# Patient Record
Sex: FEMALE | Race: BLACK OR AFRICAN AMERICAN | NOT HISPANIC OR LATINO | URBAN - METROPOLITAN AREA
[De-identification: names, ages, dates, MRNs, and addresses within clinical notes are randomized per-mention and may not be internally consistent; named-entity substitution may affect disease eponyms.]

---

## 2018-09-25 ENCOUNTER — EMERGENCY (EMERGENCY)
Facility: HOSPITAL | Age: 38
LOS: 0 days | Discharge: HOME | End: 2018-09-25
Attending: EMERGENCY MEDICINE
Payer: COMMERCIAL

## 2018-09-25 VITALS
TEMPERATURE: 98 F | SYSTOLIC BLOOD PRESSURE: 132 MMHG | RESPIRATION RATE: 16 BRPM | HEART RATE: 76 BPM | OXYGEN SATURATION: 99 % | DIASTOLIC BLOOD PRESSURE: 88 MMHG

## 2018-09-25 VITALS
HEIGHT: 66 IN | SYSTOLIC BLOOD PRESSURE: 138 MMHG | HEART RATE: 74 BPM | TEMPERATURE: 98 F | OXYGEN SATURATION: 99 % | RESPIRATION RATE: 18 BRPM | WEIGHT: 200.4 LBS | DIASTOLIC BLOOD PRESSURE: 91 MMHG

## 2018-09-25 DIAGNOSIS — R51 HEADACHE: ICD-10-CM

## 2018-09-25 DIAGNOSIS — D25.9 LEIOMYOMA OF UTERUS, UNSPECIFIED: ICD-10-CM

## 2018-09-25 DIAGNOSIS — N94.6 DYSMENORRHEA, UNSPECIFIED: ICD-10-CM

## 2018-09-25 LAB
ALBUMIN SERPL ELPH-MCNC: 3.7 G/DL — SIGNIFICANT CHANGE UP (ref 3.3–5)
ALP SERPL-CCNC: 41 U/L — SIGNIFICANT CHANGE UP (ref 40–120)
ALT FLD-CCNC: 30 U/L — SIGNIFICANT CHANGE UP (ref 12–78)
ANION GAP SERPL CALC-SCNC: 7 MMOL/L — SIGNIFICANT CHANGE UP (ref 5–17)
APTT BLD: 29.5 SEC — SIGNIFICANT CHANGE UP (ref 27.5–37.4)
AST SERPL-CCNC: 20 U/L — SIGNIFICANT CHANGE UP (ref 15–37)
BASOPHILS # BLD AUTO: 0.04 K/UL — SIGNIFICANT CHANGE UP (ref 0–0.2)
BASOPHILS NFR BLD AUTO: 0.5 % — SIGNIFICANT CHANGE UP (ref 0–2)
BILIRUB SERPL-MCNC: 0.4 MG/DL — SIGNIFICANT CHANGE UP (ref 0.2–1.2)
BLD GP AB SCN SERPL QL: SIGNIFICANT CHANGE UP
BUN SERPL-MCNC: 10 MG/DL — SIGNIFICANT CHANGE UP (ref 7–23)
CALCIUM SERPL-MCNC: 9.5 MG/DL — SIGNIFICANT CHANGE UP (ref 8.5–10.1)
CHLORIDE SERPL-SCNC: 106 MMOL/L — SIGNIFICANT CHANGE UP (ref 96–108)
CO2 SERPL-SCNC: 28 MMOL/L — SIGNIFICANT CHANGE UP (ref 22–31)
CREAT SERPL-MCNC: 0.8 MG/DL — SIGNIFICANT CHANGE UP (ref 0.5–1.3)
EOSINOPHIL # BLD AUTO: 0.08 K/UL — SIGNIFICANT CHANGE UP (ref 0–0.5)
EOSINOPHIL NFR BLD AUTO: 1 % — SIGNIFICANT CHANGE UP (ref 0–6)
GLUCOSE SERPL-MCNC: 77 MG/DL — SIGNIFICANT CHANGE UP (ref 70–99)
HCG SERPL-ACNC: <1 MIU/ML — SIGNIFICANT CHANGE UP
HCT VFR BLD CALC: 34.9 % — SIGNIFICANT CHANGE UP (ref 34.5–45)
HGB BLD-MCNC: 12.2 G/DL — SIGNIFICANT CHANGE UP (ref 11.5–15.5)
IMM GRANULOCYTES NFR BLD AUTO: 0.1 % — SIGNIFICANT CHANGE UP (ref 0–1.5)
INR BLD: 1.11 RATIO — SIGNIFICANT CHANGE UP (ref 0.88–1.16)
LYMPHOCYTES # BLD AUTO: 3.99 K/UL — HIGH (ref 1–3.3)
LYMPHOCYTES # BLD AUTO: 48.3 % — HIGH (ref 13–44)
MCHC RBC-ENTMCNC: 26.7 PG — LOW (ref 27–34)
MCHC RBC-ENTMCNC: 35 GM/DL — SIGNIFICANT CHANGE UP (ref 32–36)
MCV RBC AUTO: 76.4 FL — LOW (ref 80–100)
MONOCYTES # BLD AUTO: 0.5 K/UL — SIGNIFICANT CHANGE UP (ref 0–0.9)
MONOCYTES NFR BLD AUTO: 6.1 % — SIGNIFICANT CHANGE UP (ref 2–14)
NEUTROPHILS # BLD AUTO: 3.64 K/UL — SIGNIFICANT CHANGE UP (ref 1.8–7.4)
NEUTROPHILS NFR BLD AUTO: 44 % — SIGNIFICANT CHANGE UP (ref 43–77)
NRBC # BLD: 0 /100 WBCS — SIGNIFICANT CHANGE UP (ref 0–0)
PLATELET # BLD AUTO: 263 K/UL — SIGNIFICANT CHANGE UP (ref 150–400)
POTASSIUM SERPL-MCNC: 4.1 MMOL/L — SIGNIFICANT CHANGE UP (ref 3.5–5.3)
POTASSIUM SERPL-SCNC: 4.1 MMOL/L — SIGNIFICANT CHANGE UP (ref 3.5–5.3)
PROT SERPL-MCNC: 8 GM/DL — SIGNIFICANT CHANGE UP (ref 6–8.3)
PROTHROM AB SERPL-ACNC: 12.1 SEC — SIGNIFICANT CHANGE UP (ref 9.8–12.7)
RBC # BLD: 4.57 M/UL — SIGNIFICANT CHANGE UP (ref 3.8–5.2)
RBC # FLD: 14.6 % — HIGH (ref 10.3–14.5)
SODIUM SERPL-SCNC: 141 MMOL/L — SIGNIFICANT CHANGE UP (ref 135–145)
WBC # BLD: 8.26 K/UL — SIGNIFICANT CHANGE UP (ref 3.8–10.5)
WBC # FLD AUTO: 8.26 K/UL — SIGNIFICANT CHANGE UP (ref 3.8–10.5)

## 2018-09-25 PROCEDURE — 70450 CT HEAD/BRAIN W/O DYE: CPT | Mod: 26

## 2018-09-25 PROCEDURE — 99284 EMERGENCY DEPT VISIT MOD MDM: CPT

## 2018-09-25 RX ORDER — KETOROLAC TROMETHAMINE 30 MG/ML
30 SYRINGE (ML) INJECTION ONCE
Qty: 0 | Refills: 0 | Status: DISCONTINUED | OUTPATIENT
Start: 2018-09-25 | End: 2018-09-25

## 2018-09-25 RX ORDER — SODIUM CHLORIDE 9 MG/ML
1000 INJECTION INTRAMUSCULAR; INTRAVENOUS; SUBCUTANEOUS ONCE
Qty: 0 | Refills: 0 | Status: COMPLETED | OUTPATIENT
Start: 2018-09-25 | End: 2018-09-25

## 2018-09-25 RX ORDER — ACETAMINOPHEN 500 MG
975 TABLET ORAL ONCE
Qty: 0 | Refills: 0 | Status: COMPLETED | OUTPATIENT
Start: 2018-09-25 | End: 2018-09-25

## 2018-09-25 RX ORDER — METOCLOPRAMIDE HCL 10 MG
10 TABLET ORAL ONCE
Qty: 0 | Refills: 0 | Status: COMPLETED | OUTPATIENT
Start: 2018-09-25 | End: 2018-09-25

## 2018-09-25 RX ADMIN — Medication 975 MILLIGRAM(S): at 17:44

## 2018-09-25 RX ADMIN — SODIUM CHLORIDE 1000 MILLILITER(S): 9 INJECTION INTRAMUSCULAR; INTRAVENOUS; SUBCUTANEOUS at 16:18

## 2018-09-25 RX ADMIN — Medication 10 MILLIGRAM(S): at 16:18

## 2018-09-25 RX ADMIN — Medication 975 MILLIGRAM(S): at 16:18

## 2018-09-25 RX ADMIN — SODIUM CHLORIDE 1000 MILLILITER(S): 9 INJECTION INTRAMUSCULAR; INTRAVENOUS; SUBCUTANEOUS at 17:44

## 2018-09-25 NOTE — ED ADULT NURSE NOTE - NSIMPLEMENTINTERV_GEN_ALL_ED
Implemented All Universal Safety Interventions:  [place] to call system. Call bell, personal items and telephone within reach. Instruct patient to call for assistance. Room bathroom lighting operational. Non-slip footwear when patient is off stretcher. Physically safe environment: no spills, clutter or unnecessary equipment. Stretcher in lowest position, wheels locked, appropriate side rails in place.

## 2018-09-25 NOTE — ED PROVIDER NOTE - CARE PLAN
Principal Discharge DX:	Dysmenorrhea  Secondary Diagnosis:	Uterine fibroid  Secondary Diagnosis:	Headache, unspecified headache type

## 2018-09-25 NOTE — ED PROVIDER NOTE - NONTENDER LOCATION
periumbilical/left costovertebral angle/right lower quadrant/left upper quadrant/right upper quadrant/right costovertebral angle/left lower quadrant/umbilical/suprapubic

## 2018-09-25 NOTE — ED PROVIDER NOTE - MEDICAL DECISION MAKING DETAILS
hx, exam, labs, ct of head, no us of pelvis is indicated now because pt sts she had us of pelvis in August of this year showed 5 cm uterine fibroid and pt is pain free now.

## 2018-09-25 NOTE — ED ADULT TRIAGE NOTE - CHIEF COMPLAINT QUOTE
headache x3 days, bp 150/100 yesterday, heavy vaginal bleeding for months with constant left  abdomen and groin pain .

## 2018-09-25 NOTE — ED PROVIDER NOTE - OBJECTIVE STATEMENT
37 years old female walked in c/o right side headache constant dizziness and left pelvis pain for 4 days. Pt sts she is having her normal menstrual cycle now. Pt sts she has a hx of uterine fibroid for 10 years last us of pelvis was in August 2018 and showed 5 cm left side uterine fibroid and her ob/gyn recommends sx.. Pt denies trauma, loc, blurred visions, light sensitivities, focal/distal weakness or numbness, cough, sob, chest pain, nausea, vomiting, fever, chills, dysuria or irregular bowel movements.

## 2018-09-25 NOTE — ED PROVIDER NOTE - PROGRESS NOTE DETAILS
Pt is smiling laughing appears very comfortable no focal neuro deficits on exam pt's abd is soft nontender to palp at all quadrants. Pt is given and explained all test reports and advised to follow up with her ob/gyn or Dr. Atkins as soon as possible.

## 2018-09-25 NOTE — ED ADULT NURSE NOTE - OBJECTIVE STATEMENT
received er bed c c/o headaches x 3-4 days dizziness intermittantly denies n/v no photosensitivity a&ox 3 denies recent falls or head trauma

## 2020-04-08 ENCOUNTER — EMERGENCY (EMERGENCY)
Facility: HOSPITAL | Age: 40
LOS: 0 days | Discharge: HOME | End: 2020-04-09
Attending: EMERGENCY MEDICINE
Payer: COMMERCIAL

## 2020-04-08 VITALS
DIASTOLIC BLOOD PRESSURE: 110 MMHG | SYSTOLIC BLOOD PRESSURE: 148 MMHG | TEMPERATURE: 98 F | WEIGHT: 205.03 LBS | RESPIRATION RATE: 20 BRPM | OXYGEN SATURATION: 100 % | HEIGHT: 66 IN | HEART RATE: 106 BPM

## 2020-04-08 PROCEDURE — 99285 EMERGENCY DEPT VISIT HI MDM: CPT

## 2020-04-08 PROCEDURE — 93010 ELECTROCARDIOGRAM REPORT: CPT

## 2020-04-08 NOTE — ED ADULT TRIAGE NOTE - CHIEF COMPLAINT QUOTE
Pt c/o intermittent chest tightness , cough , and sob x yesterday, pt stated chest tightness worsen today. h/o HTN.

## 2020-04-09 VITALS
TEMPERATURE: 98 F | OXYGEN SATURATION: 99 % | SYSTOLIC BLOOD PRESSURE: 134 MMHG | DIASTOLIC BLOOD PRESSURE: 76 MMHG | HEART RATE: 92 BPM | RESPIRATION RATE: 18 BRPM

## 2020-04-09 DIAGNOSIS — R06.02 SHORTNESS OF BREATH: ICD-10-CM

## 2020-04-09 DIAGNOSIS — R05 COUGH: ICD-10-CM

## 2020-04-09 DIAGNOSIS — R07.9 CHEST PAIN, UNSPECIFIED: ICD-10-CM

## 2020-04-09 DIAGNOSIS — D25.9 LEIOMYOMA OF UTERUS, UNSPECIFIED: ICD-10-CM

## 2020-04-09 DIAGNOSIS — K21.9 GASTRO-ESOPHAGEAL REFLUX DISEASE WITHOUT ESOPHAGITIS: ICD-10-CM

## 2020-04-09 DIAGNOSIS — I10 ESSENTIAL (PRIMARY) HYPERTENSION: ICD-10-CM

## 2020-04-09 LAB
ALBUMIN SERPL ELPH-MCNC: 3.2 G/DL — LOW (ref 3.3–5)
ALP SERPL-CCNC: 48 U/L — SIGNIFICANT CHANGE UP (ref 40–120)
ALT FLD-CCNC: 30 U/L — SIGNIFICANT CHANGE UP (ref 12–78)
ANION GAP SERPL CALC-SCNC: 10 MMOL/L — SIGNIFICANT CHANGE UP (ref 5–17)
ANISOCYTOSIS BLD QL: SLIGHT — SIGNIFICANT CHANGE UP
APTT BLD: 28.5 SEC — SIGNIFICANT CHANGE UP (ref 28.5–37)
AST SERPL-CCNC: 14 U/L — LOW (ref 15–37)
BASOPHILS # BLD AUTO: 0.03 K/UL — SIGNIFICANT CHANGE UP (ref 0–0.2)
BASOPHILS NFR BLD AUTO: 0.3 % — SIGNIFICANT CHANGE UP (ref 0–2)
BILIRUB SERPL-MCNC: 0.2 MG/DL — SIGNIFICANT CHANGE UP (ref 0.2–1.2)
BUN SERPL-MCNC: 9 MG/DL — SIGNIFICANT CHANGE UP (ref 7–23)
CALCIUM SERPL-MCNC: 8.4 MG/DL — LOW (ref 8.5–10.1)
CHLORIDE SERPL-SCNC: 107 MMOL/L — SIGNIFICANT CHANGE UP (ref 96–108)
CO2 SERPL-SCNC: 24 MMOL/L — SIGNIFICANT CHANGE UP (ref 22–31)
CREAT SERPL-MCNC: 0.96 MG/DL — SIGNIFICANT CHANGE UP (ref 0.5–1.3)
EOSINOPHIL # BLD AUTO: 0.06 K/UL — SIGNIFICANT CHANGE UP (ref 0–0.5)
EOSINOPHIL NFR BLD AUTO: 0.6 % — SIGNIFICANT CHANGE UP (ref 0–6)
GLUCOSE SERPL-MCNC: 150 MG/DL — HIGH (ref 70–99)
HCG SERPL-ACNC: <1 MIU/ML — SIGNIFICANT CHANGE UP
HCT VFR BLD CALC: 33.7 % — LOW (ref 34.5–45)
HGB BLD-MCNC: 11.8 G/DL — SIGNIFICANT CHANGE UP (ref 11.5–15.5)
IMM GRANULOCYTES NFR BLD AUTO: 0.2 % — SIGNIFICANT CHANGE UP (ref 0–1.5)
INR BLD: 1.18 RATIO — HIGH (ref 0.88–1.16)
LYMPHOCYTES # BLD AUTO: 3.27 K/UL — SIGNIFICANT CHANGE UP (ref 1–3.3)
LYMPHOCYTES # BLD AUTO: 31.2 % — SIGNIFICANT CHANGE UP (ref 13–44)
MACROCYTES BLD QL: SLIGHT — SIGNIFICANT CHANGE UP
MAGNESIUM SERPL-MCNC: 2.2 MG/DL — SIGNIFICANT CHANGE UP (ref 1.6–2.6)
MANUAL SMEAR VERIFICATION: SIGNIFICANT CHANGE UP
MCHC RBC-ENTMCNC: 26.1 PG — LOW (ref 27–34)
MCHC RBC-ENTMCNC: 35 GM/DL — SIGNIFICANT CHANGE UP (ref 32–36)
MCV RBC AUTO: 74.6 FL — LOW (ref 80–100)
MICROCYTES BLD QL: SLIGHT — SIGNIFICANT CHANGE UP
MONOCYTES # BLD AUTO: 1.09 K/UL — HIGH (ref 0–0.9)
MONOCYTES NFR BLD AUTO: 10.4 % — SIGNIFICANT CHANGE UP (ref 2–14)
NEUTROPHILS # BLD AUTO: 6 K/UL — SIGNIFICANT CHANGE UP (ref 1.8–7.4)
NEUTROPHILS NFR BLD AUTO: 57.3 % — SIGNIFICANT CHANGE UP (ref 43–77)
NRBC # BLD: 0 /100 WBCS — SIGNIFICANT CHANGE UP (ref 0–0)
NT-PROBNP SERPL-SCNC: 22 PG/ML — SIGNIFICANT CHANGE UP (ref 0–125)
PLAT MORPH BLD: NORMAL — SIGNIFICANT CHANGE UP
PLATELET # BLD AUTO: 254 K/UL — SIGNIFICANT CHANGE UP (ref 150–400)
POTASSIUM SERPL-MCNC: 4.1 MMOL/L — SIGNIFICANT CHANGE UP (ref 3.5–5.3)
POTASSIUM SERPL-SCNC: 4.1 MMOL/L — SIGNIFICANT CHANGE UP (ref 3.5–5.3)
PROT SERPL-MCNC: 7.6 GM/DL — SIGNIFICANT CHANGE UP (ref 6–8.3)
PROTHROM AB SERPL-ACNC: 13.3 SEC — HIGH (ref 10–12.9)
RBC # BLD: 4.52 M/UL — SIGNIFICANT CHANGE UP (ref 3.8–5.2)
RBC # FLD: 15.1 % — HIGH (ref 10.3–14.5)
RBC BLD AUTO: SIGNIFICANT CHANGE UP
SODIUM SERPL-SCNC: 141 MMOL/L — SIGNIFICANT CHANGE UP (ref 135–145)
TARGETS BLD QL SMEAR: SLIGHT — SIGNIFICANT CHANGE UP
TROPONIN I SERPL-MCNC: <.015 NG/ML — SIGNIFICANT CHANGE UP (ref 0.01–0.04)
TROPONIN I SERPL-MCNC: <.015 NG/ML — SIGNIFICANT CHANGE UP (ref 0.01–0.04)
WBC # BLD: 10.47 K/UL — SIGNIFICANT CHANGE UP (ref 3.8–10.5)
WBC # FLD AUTO: 10.47 K/UL — SIGNIFICANT CHANGE UP (ref 3.8–10.5)

## 2020-04-09 PROCEDURE — 71045 X-RAY EXAM CHEST 1 VIEW: CPT | Mod: 26

## 2020-04-09 RX ORDER — SUCRALFATE 1 G
1 TABLET ORAL ONCE
Refills: 0 | Status: COMPLETED | OUTPATIENT
Start: 2020-04-09 | End: 2020-04-09

## 2020-04-09 RX ORDER — FAMOTIDINE 10 MG/ML
1 INJECTION INTRAVENOUS
Qty: 28 | Refills: 0
Start: 2020-04-09 | End: 2020-04-22

## 2020-04-09 RX ORDER — SUCRALFATE 1 G
1 TABLET ORAL
Qty: 56 | Refills: 0
Start: 2020-04-09 | End: 2020-04-22

## 2020-04-09 RX ORDER — ALPRAZOLAM 0.25 MG
0.25 TABLET ORAL ONCE
Refills: 0 | Status: DISCONTINUED | OUTPATIENT
Start: 2020-04-09 | End: 2020-04-09

## 2020-04-09 RX ORDER — FAMOTIDINE 10 MG/ML
20 INJECTION INTRAVENOUS ONCE
Refills: 0 | Status: COMPLETED | OUTPATIENT
Start: 2020-04-09 | End: 2020-04-09

## 2020-04-09 RX ORDER — LIDOCAINE 4 G/100G
10 CREAM TOPICAL ONCE
Refills: 0 | Status: COMPLETED | OUTPATIENT
Start: 2020-04-09 | End: 2020-04-09

## 2020-04-09 RX ORDER — LIDOCAINE 4 G/100G
15 CREAM TOPICAL ONCE
Refills: 0 | Status: COMPLETED | OUTPATIENT
Start: 2020-04-09 | End: 2020-04-09

## 2020-04-09 RX ADMIN — LIDOCAINE 10 MILLILITER(S): 4 CREAM TOPICAL at 05:08

## 2020-04-09 RX ADMIN — Medication 1 GRAM(S): at 02:46

## 2020-04-09 RX ADMIN — Medication 30 MILLILITER(S): at 00:22

## 2020-04-09 RX ADMIN — Medication 0.25 MILLIGRAM(S): at 05:08

## 2020-04-09 RX ADMIN — FAMOTIDINE 20 MILLIGRAM(S): 10 INJECTION INTRAVENOUS at 00:22

## 2020-04-09 RX ADMIN — LIDOCAINE 15 MILLILITER(S): 4 CREAM TOPICAL at 02:46

## 2020-04-09 NOTE — ED PROVIDER NOTE - CLINICAL SUMMARY MEDICAL DECISION MAKING FREE TEXT BOX
Lab values do not require emergent intervention. EKG wnl. Pt well appearing, VSS, comfortable, pain resolved with gi cocktail. likely gerd.

## 2020-04-09 NOTE — ED ADULT NURSE REASSESSMENT NOTE - NS ED NURSE REASSESS COMMENT FT1
pt d/c awake and alert in nad ; reports relief from epigastric/chest pain; piv removed intact; safety maintianed

## 2020-04-09 NOTE — ED PROVIDER NOTE - PATIENT PORTAL LINK FT
You can access the FollowMyHealth Patient Portal offered by [facility] by registering at the following website: [email or web address]. By joining [employer]’s FollowMyHealth portal, you will also be able to view your health information using other applications (apps) compatible with our system. You can access the FollowMyHealth Patient Portal offered by [facility] by registering at the following website: [email or web address]. By joining [employer]’s FollowMyHealth portal, you will also be able to view your health information using other applications (apps) compatible with our system.

## 2020-04-09 NOTE — ED PROVIDER NOTE - CARE PROVIDER_API CALL
Farzad Lloyd)  Medicine  [address] [place] [address]  Phone: [phone]  Fax: [phone]  Follow Up Time:     Mohit Jacobson)  Cardiology; Interventional Cardiology  [address] [place] [id number]  Phone: [phone]  Fax: [phone]  Follow Up Time:

## 2021-06-16 NOTE — ED ADULT NURSE NOTE - NSSISCREENINGQ3_ED_A_ED
Refill Approved    Rx renewed per Medication Renewal Policy. Medication was last renewed on 8/5/20.    Jack Bojorquez, [facility] Triage/Med Refill 3/19/2021     Requested Prescriptions   Pending Prescriptions Disp Refills     lisinopriL (PRINIVIL,ZESTRIL) 20 MG tablet [Pharmacy Med Name: LISINOPRIL TABS 20MG] 90 tablet 3     Sig: TAKE 1 TABLET DAILY (NEW DOSE)       Ace Inhibitors Refill Protocol Passed - 3/17/2021 11:32 PM        Passed - PCP or prescribing provider visit in past 12 months       Last office visit with prescriber/PCP: 10/20/2020 Ronnie Overton MD OR same dept: 10/20/2020 Ronnie Overton MD OR same specialty: 10/20/2020 Ronnie Overton MD  Last physical: 7/14/2020 Last MTM visit: Visit date not found   Next visit within 3 mo: Visit date not found  Next physical within 3 mo: Visit date not found  Prescriber OR PCP: Ronnie Overton MD  Last diagnosis associated with med order: 1. Essential hypertension  - lisinopriL (PRINIVIL,ZESTRIL) 20 MG tablet [Pharmacy Med Name: LISINOPRIL TABS 20MG]; TAKE 1 TABLET DAILY (NEW DOSE)  Dispense: 90 tablet; Refill: 3    If protocol passes may refill for 12 months if within 3 months of last provider visit (or a total of 15 months).             Passed - Serum Potassium in past 12 months     Lab Results   Component Value Date    Potassium 4.2 07/14/2020             Passed - Blood pressure filed in past 12 months     BP Readings from Last 1 Encounters:   10/20/20 122/80             Passed - Serum Creatinine in past 12 months     Creatinine   Date Value Ref Range Status   07/14/2020 1.21 (H) 0.60 - 1.10 mg/dL Final                            
No

## 2022-07-10 ENCOUNTER — EMERGENCY (EMERGENCY)
Facility: HOSPITAL | Age: 42
LOS: 0 days | Discharge: HOME | End: 2022-07-10
Attending: STUDENT IN AN ORGANIZED HEALTH CARE EDUCATION/TRAINING PROGRAM

## 2022-07-10 VITALS
DIASTOLIC BLOOD PRESSURE: 78 MMHG | WEIGHT: 190.04 LBS | SYSTOLIC BLOOD PRESSURE: 113 MMHG | HEART RATE: 72 BPM | HEIGHT: 66 IN | OXYGEN SATURATION: 98 % | RESPIRATION RATE: 19 BRPM | TEMPERATURE: 98 F

## 2022-07-10 VITALS
OXYGEN SATURATION: 100 % | RESPIRATION RATE: 16 BRPM | TEMPERATURE: 98 F | HEART RATE: 65 BPM | SYSTOLIC BLOOD PRESSURE: 140 MMHG | DIASTOLIC BLOOD PRESSURE: 85 MMHG
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
## 2022-07-10 DIAGNOSIS — S70.212A ABRASION, LEFT HIP, INITIAL ENCOUNTER: ICD-10-CM

## 2022-07-10 DIAGNOSIS — S23.3XXA SPRAIN OF LIGAMENTS OF THORACIC SPINE, INITIAL ENCOUNTER: ICD-10-CM

## 2022-07-10 DIAGNOSIS — S50.311A ABRASION OF RIGHT ELBOW, INITIAL ENCOUNTER: ICD-10-CM

## 2022-07-10 DIAGNOSIS — I10 ESSENTIAL (PRIMARY) HYPERTENSION: ICD-10-CM

## 2022-07-10 DIAGNOSIS — S50.312A ABRASION OF LEFT ELBOW, INITIAL ENCOUNTER: ICD-10-CM
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
## 2022-07-10 DIAGNOSIS — S00.81XA ABRASION OF OTHER PART OF HEAD, INITIAL ENCOUNTER: ICD-10-CM

## 2022-07-10 DIAGNOSIS — M25.552 PAIN IN LEFT HIP: ICD-10-CM

## 2022-07-10 DIAGNOSIS — M54.9 DORSALGIA, UNSPECIFIED: ICD-10-CM

## 2022-07-10 PROCEDURE — 73502 X-RAY EXAM HIP UNI 2-3 VIEWS: CPT | Mod: 26,RT

## 2022-07-10 PROCEDURE — 73080 X-RAY EXAM OF ELBOW: CPT | Mod: 26,50

## 2022-07-10 PROCEDURE — 99285 EMERGENCY DEPT VISIT HI MDM: CPT

## 2022-07-10 PROCEDURE — 72131 CT LUMBAR SPINE W/O DYE: CPT | Mod: 26,MA

## 2022-07-10 PROCEDURE — 72128 CT CHEST SPINE W/O DYE: CPT | Mod: 26,MA

## 2022-07-10 RX ORDER — MULTIVIT-MIN/FERROUS GLUCONATE 9 MG/15 ML
1 LIQUID (ML) ORAL
Qty: 0 | Refills: 0 | DISCHARGE

## 2022-07-10 RX ORDER — LIDOCAINE 4 G/100G
1 CREAM TOPICAL ONCE
Refills: 0 | Status: COMPLETED | OUTPATIENT
Start: 2022-07-10 | End: 2022-07-10

## 2022-07-10 RX ORDER — IBUPROFEN 200 MG
1 TABLET ORAL
Qty: 20 | Refills: 0
Start: 2022-07-10 | End: 2022-07-14

## 2022-07-10 RX ORDER — IBUPROFEN 200 MG
600 TABLET ORAL ONCE
Refills: 0 | Status: COMPLETED | OUTPATIENT
Start: 2022-07-10 | End: 2022-07-10

## 2022-07-10 RX ORDER — METHOCARBAMOL 500 MG/1
2 TABLET, FILM COATED ORAL
Qty: 30 | Refills: 0
Start: 2022-07-10 | End: 2022-07-14

## 2022-07-10 RX ORDER — ACETAMINOPHEN 500 MG
650 TABLET ORAL ONCE
Refills: 0 | Status: COMPLETED | OUTPATIENT
Start: 2022-07-10 | End: 2022-07-10

## 2022-07-10 RX ORDER — LOSARTAN POTASSIUM 100 MG/1
0 TABLET, FILM COATED ORAL
Qty: 0 | Refills: 0 | DISCHARGE

## 2022-07-10 RX ADMIN — LIDOCAINE 1 PATCH: 4 CREAM TOPICAL at 19:18

## 2022-07-10 RX ADMIN — Medication 650 MILLIGRAM(S): at 19:18

## 2022-07-10 RX ADMIN — Medication 600 MILLIGRAM(S): at 18:10

## 2022-07-10 RX ADMIN — Medication 650 MILLIGRAM(S): at 18:05

## 2022-07-10 RX ADMIN — LIDOCAINE 1 PATCH: 4 CREAM TOPICAL at 18:05

## 2022-07-10 RX ADMIN — Medication 600 MILLIGRAM(S): at 19:18
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
## 2022-07-10 NOTE — ED PROVIDER NOTE - WR ORDER STATUS 3
SAFE MEDICATIONS IN PREGNANCY  Headache  • Tylenol (Acetaminophen) 325 mg 2 tablets every 4-6 hours  • Extra Strength Tylenol 500mg 1-2 tablets every 4-6 hours  • Excedrin Tension (Aspirin Free)  • Do NOT take Ibuprofen, Motrin or Aleve unless instructed by your care provider  • Do NOT take aspirin or products containing aspirin unless instructed by your care provider    Migraine  • Tylenol (Acetaminophen) 325 mg 2 tablets every 4-6 hours.  • Extra Strength Tylenol 500mg 1-2 tablets every 4-6 hours  • Aspirin Free Excedrin Migraine  • Contact your care provider if no relief    Allergies  • Sudafed           •Zyrtec  •Mucinex  • Claritin             •Benadryl    Cold/Congestion/Cough/ Sore Throat  • Drink 6-8 glasses of water a day  • Sudafed             •Claritin •Robitussin DM  • Cough Drops     •Chloraseptic Spray  • Salt water gargle   •Saline nasal spray  • Vicks Vapor rub    •Neti pot    Heartburn  • Eat 5-6 small meals a day and do not lie down right after eating  • Avoid foods that are acidic, spicy or fried  • Tums            •Gaviscon  •Mylanta  • Zantac 150mg one tablet twice a day   • Pepcid one tablet twice a day  •Prilosec one tablet daily     Nausea  • Ylui  • Bonnine   • B12, B6  10-25mg up to 3  times a day  • Chamomile tea  • Peppermint      Constipation  • Drink 6-8 glasses of water a day  • Prunes  •Fiber   •Fruits and vegetables  • Milk of Magnesia 1-2 tbsp. every night  • Colace 100mg twice a day  • Ducolax suppository  • Stool softeners   •Miralax -follow package directions  •Senna  • Power Pudding: Equal parts of Applesauce, Prune juice, Bran cereal.  May be seasoned with cinnamon and nutmeg to taste. Keep refrigerated in an air tight container.                         2 tablespoons daily.     Diarrhea (if no fever or blood in stool)  • Imodium no more than 8mg per day  • Lomotil  • Kaopectate    Insomnia  • Benadryl  • Tylenol PM  • Unisom    Yeast  infection  • Monistat  • Gyne-Lotrimin                    Pregnancy: Quad Marker Screen      Introduction    The quad marker screen, previously called the triple marker screen, is a blood test that provides a woman and her health care provider with useful information about her pregnancy.  The test predicts the likelihood of a certain problem occurring.   It does not diagnose the problem.  For example, cholesterol screening determines a person's risk for heart disease based on the amount of cholesterol in the blood, but it does not necessarily  Mean that the person has heart disease.  The quad marker screen determines if a woman is at higher or lower risk of carrying a baby with a birth defect.  This means that some women with healthy babies will have screening results indicating a possible problem (and will be offered appropriate follow-up testing) while some women whose babies have birth defects will go undetected.    Because of the uncertainties surrounding the test results, you may opt to not have it.  Talk to your doctor about the pros and cons of taking this test before you make a final decision.    What Happens During The Test?    During the quad marker screen, a sample of blood is taken from your vein.  Substances in the blood sample are measured to screen for:    · Problems in the development of the fetus' brain and spinal cord, called open neural tube defects.  The quad marker screen can predict approximately 75% of open neural tube defects.  · Genetic disorders such as Down syndrome, a chromosomal abnormality.  The quad marker screen can predict approximately 75% of Down syndrome cases in women age 35 and 85%-90% of Down syndrome cases in women 35 years and older.      When Should I Get A Quad Marker Screen?    Between 15 and 20 weeks of pregnancy, your health care provider may offer you a quad marker screen.  The test can only be performed during the 15th and 20th week of pregnancy.    What substances Are  Measured During a Quad Marker Screen?    The blood sample is sent to a laboratory and tested for the presence of the following four substances, which are normally found in the baby's blood, brain, spinal fluid and amniotic fluid:    · Alpha-fetoprotein (AFP). A protein produced by the baby's liver.  · Unconjugated Estriol (UE). A protein produced in the placenta and in the baby's liver.  · Human Chorionic Gonadotrophin (HCG).  A hormone produced by the placenta.  · Inhibin-A.  A hormone produced by the placenta.     The expected amount of these substances normally found in the mother's bloodstream changes each week of pregnancy, so it is important to tell your health care providers how far along you are in your pregnancy.  High AFP levels may indicate that the baby has an open neural tube defect.  High AFP levels may also indicate that the fetus is older than was thought or that the woman is expecting twins.  Lower than normal AFP levels could indicate that a woman is at higher risk for having a baby with Down syndrome.    Levels of HCG and Inhibin-A are higher than normal when a woman has increased risk of having a baby with Down syndrome.  Lower than normal levels of Estriol may also indicate that a woman is at high risk for having a baby with Down syndrome.    Is the Quad Marker Screen Safe?    Yes.  The quad marker screen is a safe and useful screening test for families concerned about birth defects or genetic diseases.  It is a test that carries no risk to the baby, since a blood sample is taken only from the mother.    What Does It Mean If The Quad Marker Screen Results Are Normal?    Quad marker screen results that are not in the normal range do not necessarily mean there is a problem in your pregnancy.    The quad marker screen is used for screening only, which means it can only assess your risk of having a baby with a certain birth defect (it is not used to diagnose the particular problem that may be  present).  If the quad marker screen results are not in the normal range, further tests such as an ultrasound or amniocentesis may be necessary.    Out of 1000 pregnant women, approximately 50 will have a quad marker screen results that indicate an increased risk for having a baby with a birth defect.  Of those 50 women, only one or two will actually have a baby with an open neural tube defect.  About 40 women will have quad marker screen results that show an increased risk for having a baby with Down syndrome and one or two will actually have a baby with Down syndrome.    Should I Have The Quad Marker Screen?    It is recommended that all pregnant women have a quad marker screen, but it is your decision whether or not to have the test. However, if you have any of the following risk factors, you may strongly want to consider having the test:    · You are age 35 or older when the baby is due  · Your family has a history of birth defects  · You've had a child with a previous birth defect  · You have had type 1 diabetes prior to your pregnancy    If you have any concerns about the test, talk to your doctor or health care provider.  He or she may be able to answer your questions and alleviate concerns.   Performed Resulted

## 2022-07-10 NOTE — ED PROVIDER NOTE - NSFOLLOWUPINSTRUCTIONS_ED_ALL_ED_FT
Thoracic Strain      A thoracic strain, which is sometimes called a mid-back strain, is an injury to the muscles or tendons that attach to the upper part of your back behind your chest. This type of injury occurs when a muscle is overstretched or overloaded.    Thoracic strains can range from mild to severe. Mild strains may involve stretching a muscle or tendon without tearing it. These injuries may heal in 1–2 weeks. More severe strains involve tearing of muscle fibers or tendons. These will cause more pain and may take 6–8 weeks to heal.      What are the causes?    This condition may be caused by:  •Trauma, such as a fall or a hit to the body.      •Twisting or overstretching the back. This may result from doing activities that require a lot of energy, such as lifting heavy objects.      In some cases, the cause may not be known.      What increases the risk?    This injury is more common in:  •Athletes.      •People with obesity.        What are the signs or symptoms?    The main symptom of this condition is pain in the middle back, especially with movement. Other symptoms include:  •Stiffness or limited range of motion.      •Sudden muscle tightening (spasms).        How is this diagnosed?    This condition may be diagnosed based on:  •Your symptoms.      •Your medical history.      •A physical exam.      •Imaging tests, such as X-rays or an MRI.        How is this treated?    This condition may be treated with:  •Resting the injured area.      •Applying heat and cold to the injured area.      •Over-the-counter medicines for pain and inflammation, such as NSAIDs.      •Prescription pain medicine or muscle relaxants may be needed for a short time.      •Physical therapy. This will involve doing stretching and strengthening exercises.        Follow these instructions at home:      Managing pain, stiffness, and swelling                 •If directed, put ice on the injured area.  •Put ice in a plastic bag.      •Place a towel between your skin and the bag.      •Leave the ice on for 20 minutes, 2–3 times a day.      •If directed, apply heat to the affected area as often as told by your health care provider. Use the heat source that your health care provider recommends, such as a moist heat pack or a heating pad.  •Place a towel between your skin and the heat source.      •Leave the heat on for 20–30 minutes.      •Remove the heat if your skin turns bright red. This is especially important if you are unable to feel pain, heat, or cold. You may have a greater risk of getting burned.        Activity     •Rest and return to your normal activities as told by your health care provider. Ask your health care provider what activities are safe for you.      •Do exercises as told by your health care provider.      Medicines     •Take over-the-counter and prescription medicines only as told by your health care provider.    •Ask your health care provider if the medicine prescribed to you:  •Requires you to avoid driving or using heavy machinery.    •Can cause constipation. You may need to take these actions to prevent or treat constipation:  •Drink enough fluid to keep your urine pale yellow.      •Take over-the-counter or prescription medicines.      •Eat foods that are high in fiber, such as beans, whole grains, and fresh fruits and vegetables.      •Limit foods that are high in fat and processed sugars, such as fried or sweet foods.            Injury prevention      To prevent a future mid-back injury:  •Always warm up properly before physical activity or sports.      •Cool down and stretch after being active.      •Use correct form when playing sports and lifting heavy objects. Bend your knees before you lift heavy objects.      •Use good posture when sitting and standing.    •Stay physically fit and maintain a healthy weight.  •Do at least 150 minutes of moderate-intensity exercise each week, such as brisk walking or water aerobics.      •Do strength exercises at least 2 times each week.        General instructions     • Do not use any products that contain nicotine or tobacco, such as cigarettes, e-cigarettes, and chewing tobacco. If you need help quitting, ask your health care provider.      •Keep all follow-up visits as told by your health care provider. This is important.        Contact a health care provider if:    •Your pain is not helped by medicine.      •Your pain or stiffness is getting worse.      •You develop pain or stiffness in your neck or lower back.        Get help right away if you:    •Have shortness of breath.      •Have chest pain.      •Develop numbness or weakness in your legs or arms.      •Have involuntary loss of urine (urinary incontinence).        Summary    •A thoracic strain, which is sometimes called a mid-back strain, is an injury to the muscles or tendons that attach to the upper part of your back behind your chest.      •This type of injury occurs when a muscle is overstretched or overloaded.      •Rest and return to your normal activities as told by your health care provider. If directed, apply heat or ice to the affected area as often as told by your health care provider.      •Take over-the-counter and prescription medicines only as told by your health care provider.      •Contact a health care provider if you have new or worsening symptoms.      This information is not intended to replace advice given to you by your health care provider. Make sure you discuss any questions you have with your health care provider.          Abrasion      An abrasion is a cut or a scrape on the surface of the skin. An abrasion does not go through all the layers of the skin. It is important to care for an abrasion properly to prevent infection.      What are the causes?    This condition is caused by rubbing your skin on something or falling on a surface, such as the ground. When your skin rubs on something, some layers of skin may rub off.      What are the signs or symptoms?    The main symptom of this condition is a cut or a scrape. The cut or scrape may be bleeding, or it may appear red or pink. If your abrasion was caused by a fall, there may be a bruise under your cut or scrape.      How is this diagnosed?    An abrasion is diagnosed with a physical exam.      How is this treated?    Treatment for this condition depends on how large and deep the abrasion is. In most cases:  •Your abrasion will be cleaned with water and mild soap. This is done to remove any dirt or debris (such as tiny bits of glass or rock) that may be stuck in your wound.      •An antibiotic ointment may be applied to your abrasion to help prevent infection.      •A bandage (dressing) may be placed on your abrasion to keep it clean.      You may also need a tetanus shot.      Follow these instructions at home:    Medicines     •Take or apply over-the-counter and prescription medicines only as told by your health care provider.      •If you were prescribed an antibiotic medicine, use it as told by your health care provider. Do not stop using the antibiotic even if you start to feel better.      Wound care   •Clean your wound 1 or 2 times a day, or as told by your health care provider. To do this:  1.Wash your hands for at least 20 seconds with mild soap and water. Do this before and after you clean your wound.      2.Wash your wound with mild soap and water and then rinse off the soap.      3.Pat your wound dry with a clean towel. Do not rub your wound.        •Keep your dressing clean and dry as told by your health care provider.      •There are many different ways to close and cover a wound. Follow instructions from your health care provider about caring for your wound and about changing and removing your dressing. You may have to change your dressing one or more times a day, or as directed by your health care provider.    •Check your wound every day for signs of infection. Check for:  •Redness, especially a red streak that spreads out from your wound.      •Swelling or increased pain.      •Warmth.      •Blood, fluid, pus, or a bad smell.          Managing pain and swelling    •If directed, put ice on the injured area. To do this:  •Put ice in a plastic bag.       •Place a towel between your skin and the bag.       • Leave the ice on for 20 minutes, 2–3 times a day.      •Remove the ice if your skin turns bright red. This is very important. If you cannot feel pain, heat, or cold, you have a greater risk of damage to the area.        •If possible, raise (elevate) the injured area above the level of your heart while you are sitting or lying down.      General instructions     • Do not take baths, swim, or use a hot tub until your health care provider approves. Ask your doctor about taking showers or sponge baths.      •Keep all follow-up visits. This is important.        Contact a health care provider if:    •You received a tetanus shot, and you have swelling, severe pain, redness, or bleeding at your injection site.      •Your pain is not controlled with medicine.      •You have a fever.    •You have any of these signs of infection:  •Redness, swelling, or more pain around your wound.      •Warmth coming from your wound.      •Blood, fluid, pus, or a bad smell coming from your wound.          Get help right away if:    •You have a red streak spreading away from your wound.        Summary    •An abrasion is a cut or a scrape on the surface of the skin. Care for your abrasion properly to prevent infection.      •Clean your wound with mild soap and water 1 or 2 times a day or as often as told. Follow instructions from your health care provider about taking medicines and changing your bandage (dressing).      •Contact your health care provider if you have a fever or if you have redness, swelling, or more pain around your wound.      •Contact your health care provider if you have warmth, blood, fluid, pus, or a bad smell coming from your wound.      •Get help right away if there is a red streak spreading away from your wound.      This information is not intended to replace advice given to you by your health care provider. Make sure you discuss any questions you have with your health care provider.
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
## 2022-07-10 NOTE — ED ADULT NURSE REASSESSMENT NOTE - NS ED NURSE REASSESS COMMENT FT1
1800- Pt signed waiver form with good understanding affirming not being pregnant prior going for imaging.

## 2022-07-10 NOTE — ED PROVIDER NOTE - PATIENT PORTAL LINK FT
You can access the FollowMyHealth Patient Portal offered by [facility] by registering at the following website: [email or web address]. By joining [facility]’s FollowMyHealth portal, you will also be able to view your health information using other applications (apps) compatible with our system.

## 2022-07-10 NOTE — ED PROVIDER NOTE - CARE PROVIDER_API CALL
Alexander Pate (DO)  Orthopaedic Surgery Surgery  [address] [facility], [address] [place] [address]  Phone: [phone]  Fax: [phone]  Follow Up Time: 1-3 Days
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
## 2023-03-08 NOTE — ED PROVIDER NOTE - CROS ED NEURO ALL NEG
[Dyspnea on exertion] : dyspnea during exertion [Wheezing] : wheezing [Joint Pain] : joint pain [Joint Stiffness] : joint stiffness [Myalgia] : myalgia [Negative] : Heme/Lymph [SOB] : no shortness of breath - - - [Chest Discomfort] : no chest discomfort [Lower Ext Edema] : no extremity edema [Leg Claudication] : no intermittent leg claudication [Palpitations] : no palpitations [Orthopnea] : no orthopnea [PND] : no PND [Syncope] : no syncope [Cough] : no cough [Coughing Up Blood] : no hemoptysis [Snoring] : no snoring

## 2023-11-22 NOTE — ED PROVIDER NOTE - INTERNATIONAL TRAVEL
NGT removed per order, Patient ambulated to RR with assistance. No acute distress noted. Will continue to monitor throughout shift.   No